# Patient Record
Sex: FEMALE | Race: ASIAN | NOT HISPANIC OR LATINO | URBAN - METROPOLITAN AREA
[De-identification: names, ages, dates, MRNs, and addresses within clinical notes are randomized per-mention and may not be internally consistent; named-entity substitution may affect disease eponyms.]

---

## 2017-07-20 ENCOUNTER — COMMUNICATION - HEALTHEAST (OUTPATIENT)
Dept: FAMILY MEDICINE | Facility: CLINIC | Age: 42
End: 2017-07-20

## 2017-07-24 ENCOUNTER — RECORDS - HEALTHEAST (OUTPATIENT)
Dept: ADMINISTRATIVE | Facility: OTHER | Age: 42
End: 2017-07-24

## 2017-07-24 ENCOUNTER — PRENATAL OFFICE VISIT - HEALTHEAST (OUTPATIENT)
Dept: FAMILY MEDICINE | Facility: CLINIC | Age: 42
End: 2017-07-24

## 2017-07-24 DIAGNOSIS — Z34.90 NORMAL PREGNANCY: ICD-10-CM

## 2017-07-24 DIAGNOSIS — Z32.01 POSITIVE PREGNANCY TEST: ICD-10-CM

## 2017-07-24 DIAGNOSIS — N92.6 ABNORMAL MENSES: ICD-10-CM

## 2017-07-24 DIAGNOSIS — O09.529 ADVANCED MATERNAL AGE IN MULTIGRAVIDA: ICD-10-CM

## 2017-07-24 DIAGNOSIS — B19.10 HEPATITIS B: ICD-10-CM

## 2017-07-24 LAB — HIV 1+2 AB+HIV1 P24 AG SERPL QL IA: NEGATIVE

## 2017-07-24 ASSESSMENT — MIFFLIN-ST. JEOR: SCORE: 1230.91

## 2017-07-25 ENCOUNTER — AMBULATORY - HEALTHEAST (OUTPATIENT)
Dept: FAMILY MEDICINE | Facility: CLINIC | Age: 42
End: 2017-07-25

## 2017-07-25 LAB
HEPATITIS B SURFACE ANTIBODY LHE- HISTORICAL: NEGATIVE
SYPHILIS RPR SCREEN - HISTORICAL: NORMAL

## 2017-07-26 ENCOUNTER — COMMUNICATION - HEALTHEAST (OUTPATIENT)
Dept: FAMILY MEDICINE | Facility: CLINIC | Age: 42
End: 2017-07-26

## 2017-07-26 LAB
HBV CORE AB SERPL QL IA: POSITIVE
HBV SURFACE AG SERPL QL IA: ABNORMAL

## 2017-07-28 ENCOUNTER — HOSPITAL ENCOUNTER (OUTPATIENT)
Dept: ULTRASOUND IMAGING | Facility: HOSPITAL | Age: 42
Discharge: HOME | End: 2017-07-28
Attending: PHYSICIAN ASSISTANT

## 2017-07-28 DIAGNOSIS — Z34.90 NORMAL PREGNANCY: ICD-10-CM

## 2017-07-28 DIAGNOSIS — Z32.01 POSITIVE PREGNANCY TEST: ICD-10-CM

## 2017-07-31 ENCOUNTER — COMMUNICATION - HEALTHEAST (OUTPATIENT)
Dept: FAMILY MEDICINE | Facility: CLINIC | Age: 42
End: 2017-07-31

## 2017-09-05 ENCOUNTER — PRENATAL OFFICE VISIT - HEALTHEAST (OUTPATIENT)
Dept: FAMILY MEDICINE | Facility: CLINIC | Age: 42
End: 2017-09-05

## 2017-09-05 DIAGNOSIS — O09.529 ADVANCED MATERNAL AGE IN MULTIGRAVIDA: ICD-10-CM

## 2017-09-05 DIAGNOSIS — B19.10 HEPATITIS B: ICD-10-CM

## 2017-09-05 DIAGNOSIS — Z34.91 ENCOUNTER FOR SUPERVISION OF NORMAL PREGNANCY IN FIRST TRIMESTER: ICD-10-CM

## 2017-09-07 ENCOUNTER — COMMUNICATION - HEALTHEAST (OUTPATIENT)
Dept: FAMILY MEDICINE | Facility: CLINIC | Age: 42
End: 2017-09-07

## 2017-09-08 LAB — HBV DNA DETECT/QUANT, S: 3870 IU/ML

## 2017-09-22 ENCOUNTER — COMMUNICATION - HEALTHEAST (OUTPATIENT)
Dept: FAMILY MEDICINE | Facility: CLINIC | Age: 42
End: 2017-09-22

## 2017-10-03 ENCOUNTER — PRENATAL OFFICE VISIT - HEALTHEAST (OUTPATIENT)
Dept: FAMILY MEDICINE | Facility: CLINIC | Age: 42
End: 2017-10-03

## 2017-10-03 DIAGNOSIS — Z34.92 SUPERVISION OF NORMAL PREGNANCY IN SECOND TRIMESTER: ICD-10-CM

## 2017-10-03 DIAGNOSIS — B19.10 HEPATITIS B: ICD-10-CM

## 2017-10-03 DIAGNOSIS — O09.522 ELDERLY MULTIGRAVIDA IN SECOND TRIMESTER: ICD-10-CM

## 2017-10-04 LAB — HEPATITIS BE AG, S - HISTORICAL: NEGATIVE

## 2017-10-19 ENCOUNTER — HOSPITAL ENCOUNTER (OUTPATIENT)
Dept: ULTRASOUND IMAGING | Facility: HOSPITAL | Age: 42
Discharge: HOME | End: 2017-10-19

## 2017-10-19 DIAGNOSIS — Z34.92 SUPERVISION OF NORMAL PREGNANCY IN SECOND TRIMESTER: ICD-10-CM

## 2017-11-01 ENCOUNTER — PRENATAL OFFICE VISIT - HEALTHEAST (OUTPATIENT)
Dept: FAMILY MEDICINE | Facility: CLINIC | Age: 42
End: 2017-11-01

## 2017-11-01 DIAGNOSIS — Z00.00 HEALTH CARE MAINTENANCE: ICD-10-CM

## 2017-11-01 DIAGNOSIS — Z34.92 NORMAL PREGNANCY IN SECOND TRIMESTER: ICD-10-CM

## 2017-12-01 ENCOUNTER — PRENATAL OFFICE VISIT - HEALTHEAST (OUTPATIENT)
Dept: FAMILY MEDICINE | Facility: CLINIC | Age: 42
End: 2017-12-01

## 2017-12-01 DIAGNOSIS — B19.10 HEPATITIS B: ICD-10-CM

## 2017-12-01 DIAGNOSIS — Z34.92 NORMAL PREGNANCY IN SECOND TRIMESTER: ICD-10-CM

## 2017-12-01 DIAGNOSIS — H11.002 PTERYGIUM OF LEFT EYE: ICD-10-CM

## 2017-12-01 DIAGNOSIS — Z34.92 SECOND TRIMESTER PREGNANCY: ICD-10-CM

## 2017-12-01 DIAGNOSIS — O28.3 ABNORMAL ULTRASONIC FINDING ON ANTENATAL SCREENING OF MOTHER: ICD-10-CM

## 2017-12-02 LAB — SYPHILIS RPR SCREEN - HISTORICAL: NORMAL

## 2017-12-05 LAB
HBV DNA SERPL NAA+PROBE-ACNC: 1160 [IU]/ML
HBV DNA SERPL NAA+PROBE-LOG IU: 3.1 {LOG_IU}/ML

## 2017-12-07 ENCOUNTER — AMBULATORY - HEALTHEAST (OUTPATIENT)
Dept: FAMILY MEDICINE | Facility: CLINIC | Age: 42
End: 2017-12-07

## 2017-12-07 DIAGNOSIS — O99.019 ANEMIA IN PREGNANCY: ICD-10-CM

## 2017-12-08 ENCOUNTER — COMMUNICATION - HEALTHEAST (OUTPATIENT)
Dept: FAMILY MEDICINE | Facility: CLINIC | Age: 42
End: 2017-12-08

## 2017-12-08 ENCOUNTER — HOSPITAL ENCOUNTER (OUTPATIENT)
Dept: ULTRASOUND IMAGING | Facility: HOSPITAL | Age: 42
Discharge: HOME | End: 2017-12-08
Attending: FAMILY MEDICINE

## 2017-12-20 ENCOUNTER — COMMUNICATION - HEALTHEAST (OUTPATIENT)
Dept: FAMILY MEDICINE | Facility: CLINIC | Age: 42
End: 2017-12-20

## 2017-12-29 ENCOUNTER — PRENATAL OFFICE VISIT - HEALTHEAST (OUTPATIENT)
Dept: FAMILY MEDICINE | Facility: CLINIC | Age: 42
End: 2017-12-29

## 2017-12-29 DIAGNOSIS — Z34.93 NORMAL PREGNANCY IN THIRD TRIMESTER: ICD-10-CM

## 2018-01-26 ENCOUNTER — PRENATAL OFFICE VISIT - HEALTHEAST (OUTPATIENT)
Dept: FAMILY MEDICINE | Facility: CLINIC | Age: 43
End: 2018-01-26

## 2018-01-26 DIAGNOSIS — Z34.93 SUPERVISION OF NORMAL PREGNANCY IN THIRD TRIMESTER: ICD-10-CM

## 2018-01-26 DIAGNOSIS — O99.019 ANEMIA IN PREGNANCY: ICD-10-CM

## 2018-01-26 DIAGNOSIS — K59.03 DRUG-INDUCED CONSTIPATION: ICD-10-CM

## 2018-01-26 LAB
ALBUMIN UR-MCNC: NEGATIVE MG/DL
ERYTHROCYTE [DISTWIDTH] IN BLOOD BY AUTOMATED COUNT: 11.4 % (ref 11–14.5)
GLUCOSE UR STRIP-MCNC: NEGATIVE MG/DL
HCT VFR BLD AUTO: 31.2 % (ref 35–47)
HGB BLD-MCNC: 10.2 G/DL (ref 12–16)
KETONES UR STRIP-MCNC: NEGATIVE MG/DL
MCH RBC QN AUTO: 28.9 PG (ref 27–34)
MCHC RBC AUTO-ENTMCNC: 32.7 G/DL (ref 32–36)
MCV RBC AUTO: 88 FL (ref 80–100)
PLATELET # BLD AUTO: 280 THOU/UL (ref 140–440)
PMV BLD AUTO: 6.3 FL (ref 7–10)
RBC # BLD AUTO: 3.54 MILL/UL (ref 3.8–5.4)
WBC: 9.4 THOU/UL (ref 4–11)

## 2018-01-27 LAB — BACTERIA SPEC CULT: NO GROWTH

## 2018-02-09 ENCOUNTER — PRENATAL OFFICE VISIT - HEALTHEAST (OUTPATIENT)
Dept: FAMILY MEDICINE | Facility: CLINIC | Age: 43
End: 2018-02-09

## 2018-02-09 DIAGNOSIS — O99.013 ANEMIA DURING PREGNANCY IN THIRD TRIMESTER: ICD-10-CM

## 2018-02-09 DIAGNOSIS — Z34.93 NORMAL PREGNANCY IN THIRD TRIMESTER: ICD-10-CM

## 2018-02-09 LAB
ALBUMIN UR-MCNC: NEGATIVE MG/DL
GLUCOSE UR STRIP-MCNC: NEGATIVE MG/DL
KETONES UR STRIP-MCNC: NEGATIVE MG/DL

## 2018-02-09 RX ORDER — FERROUS SULFATE 325(65) MG
1 TABLET ORAL 2 TIMES DAILY
Qty: 90 TABLET | Refills: 1 | Status: SHIPPED | OUTPATIENT
Start: 2018-02-09

## 2018-02-10 LAB
ALLERGIC TO PENICILLIN: NO
GP B STREP DNA SPEC QL NAA+PROBE: NEGATIVE

## 2018-02-21 ENCOUNTER — PRENATAL OFFICE VISIT - HEALTHEAST (OUTPATIENT)
Dept: FAMILY MEDICINE | Facility: CLINIC | Age: 43
End: 2018-02-21

## 2018-02-21 DIAGNOSIS — Z34.93 NORMAL PREGNANCY IN THIRD TRIMESTER: ICD-10-CM

## 2018-03-02 ENCOUNTER — COMMUNICATION - HEALTHEAST (OUTPATIENT)
Dept: FAMILY MEDICINE | Facility: CLINIC | Age: 43
End: 2018-03-02

## 2018-03-02 ENCOUNTER — PRENATAL OFFICE VISIT - HEALTHEAST (OUTPATIENT)
Dept: FAMILY MEDICINE | Facility: CLINIC | Age: 43
End: 2018-03-02

## 2018-03-02 DIAGNOSIS — Z34.93 ENCOUNTER FOR SUPERVISION OF NORMAL PREGNANCY IN THIRD TRIMESTER: ICD-10-CM

## 2018-03-09 ENCOUNTER — PRENATAL OFFICE VISIT - HEALTHEAST (OUTPATIENT)
Dept: FAMILY MEDICINE | Facility: CLINIC | Age: 43
End: 2018-03-09

## 2018-03-09 DIAGNOSIS — Z34.93 SUPERVISION OF NORMAL PREGNANCY IN THIRD TRIMESTER: ICD-10-CM

## 2018-03-13 ENCOUNTER — HOME CARE/HOSPICE - HEALTHEAST (OUTPATIENT)
Dept: HOME HEALTH SERVICES | Facility: HOME HEALTH | Age: 43
End: 2018-03-13

## 2018-03-15 ENCOUNTER — COMMUNICATION - HEALTHEAST (OUTPATIENT)
Dept: OBGYN | Facility: HOSPITAL | Age: 43
End: 2018-03-15

## 2018-04-18 ENCOUNTER — OFFICE VISIT - HEALTHEAST (OUTPATIENT)
Dept: FAMILY MEDICINE | Facility: CLINIC | Age: 43
End: 2018-04-18

## 2018-04-18 DIAGNOSIS — K59.03 DRUG-INDUCED CONSTIPATION: ICD-10-CM

## 2018-04-18 RX ORDER — DOCUSATE SODIUM 100 MG/1
100 CAPSULE, LIQUID FILLED ORAL DAILY PRN
Qty: 30 CAPSULE | Refills: 2 | Status: SHIPPED | OUTPATIENT
Start: 2018-04-18

## 2018-04-18 ASSESSMENT — MIFFLIN-ST. JEOR: SCORE: 1226.95

## 2021-05-31 VITALS — WEIGHT: 142 LBS | BODY MASS INDEX: 26.61 KG/M2

## 2021-05-31 VITALS — BODY MASS INDEX: 26.62 KG/M2 | HEIGHT: 61 IN | WEIGHT: 141 LBS

## 2021-05-31 VITALS — WEIGHT: 149.38 LBS | BODY MASS INDEX: 27.99 KG/M2

## 2021-05-31 VITALS — BODY MASS INDEX: 25.68 KG/M2 | WEIGHT: 137 LBS

## 2021-05-31 VITALS — WEIGHT: 159 LBS | BODY MASS INDEX: 29.8 KG/M2

## 2021-05-31 VITALS — WEIGHT: 153.31 LBS | BODY MASS INDEX: 28.73 KG/M2

## 2021-05-31 VITALS — WEIGHT: 151.4 LBS | BODY MASS INDEX: 28.37 KG/M2

## 2021-05-31 VITALS — BODY MASS INDEX: 24.76 KG/M2 | WEIGHT: 132.12 LBS

## 2021-05-31 VITALS — BODY MASS INDEX: 29.24 KG/M2 | WEIGHT: 156 LBS

## 2021-05-31 VITALS — WEIGHT: 148 LBS | BODY MASS INDEX: 27.74 KG/M2

## 2021-05-31 VITALS — BODY MASS INDEX: 29.61 KG/M2 | WEIGHT: 158 LBS

## 2021-06-01 VITALS — BODY MASS INDEX: 26.62 KG/M2 | HEIGHT: 61 IN | WEIGHT: 141 LBS

## 2021-06-12 NOTE — PROGRESS NOTES
First OB.   Feeling well. No pelvic pain, bleeding.  She notes last delivery in [place].   She declines pelvic exam today, she is not sure about last pap smear, agrees to collect this post partum.   No concerns.   Encouraged good nutrition, well balanced diet, regular activity.   We discussed usual care with hepatitis B, needs hep b dna, this was collected today.   She is aware next visit and remaining prenatal care with Dr. Terrazas.

## 2021-06-12 NOTE — PROGRESS NOTES
Chief Complaint:  Chief Complaint   Patient presents with     Pregnancy Confirmation     pregnancy #: 5, LMP: 10/2016     Fatigue     and unable to eat normally.     HPI:   Jez Her is a 42 y.o. female is here for pregnancy intake.  She is .  Patient's last menstrual period was 10/31/2016.  She is very surprised about this pregnancy.  Her last period was in mid-2016.  No bleeding at all since then.  She thought this meant she was going through menopause.  She has been having breast tenderness, increased fatigue, and decreased appetite for 2-3 weeks.  She had a positive home pregnancy test about 1 week ago.  She also had a positive pregnancy test with her regular doctor, Dr. Davey.  All of her children were born at home in [facility].  One child  at about 1.5 years old after an illness (fever, rash, diarrhea).    Past medical history: reviewed and updated.  No past medical history on file.  No past surgical history on file.    Current Outpatient Prescriptions:      amoxicillin (AMOXIL) 500 MG tablet, Take 1 tablet (500 mg total) by mouth 2 (two) times a day for 7 days., Disp: 14 tablet, Rfl: 0     prenatal vit-iron fum-folic ac (PRENATAL VITAMIN) 27 mg iron- 0.8 mg Tab, Take 1 tablet by mouth once daily., Disp: 100 tablet, Rfl: 3     pyridoxine, vitamin B6, (VITAMIN B-6) 25 MG tablet, Take 1 tablet by mouth 3 times a day, as needed for nausea., Disp: 60 tablet, Rfl: 0    Social:  Social History   Substance Use Topics     Smoking status: Never Smoker     Smokeless tobacco: None     Alcohol use No       OBJECTIVE:  No Known Allergies  Vitals:    17 0950   BP: 110/78   Pulse: 60   Resp: 20   Temp: 98.6  F (37  C)     Body mass index is 26.42 kg/(m^2).    Vital signs stable as recorded above  General: Patient is alert and oriented x 3, in no apparent distress  Fetal Exam: Fundal height was not palpable, fetal heart tones are not heard.    Results:  Results for orders placed or performed in visit on  07/24/17   Culture, Urine   Result Value Ref Range    Culture 50,000-100,000 col/ml Escherichia coli (!)        Susceptibility    Escherichia coli - VICENTE     Amoxicillin + Clavulanate <=4/2 Sensitive      Ampicillin <=4 Sensitive      Cefazolin 2 Sensitive      Cefepime <=1 Sensitive      Ceftriaxone <=1 Sensitive      Ciprofloxacin 1 Sensitive      Gentamicin <=2 Sensitive      Levofloxacin <=1 Sensitive      Meropenem <=0.25 Sensitive      Nitrofurantoin <=16 Sensitive      Tetracycline <=2 Sensitive      Tobramycin <=2 Sensitive      Trimethoprim + Sulfamethoxazole <=0.5 Sensitive    Pregnancy (Beta-hCG, Qual), Urine   Result Value Ref Range    Pregnancy Test, Urine Positive (!) Negative   Urinalysis, OB Screen   Result Value Ref Range    Glucose, UA Negative Negative    Ketones, UA Negative Negative    Protein, UA Trace (!) Negative mg/dL   ABO/RH Typing (OP order)   Result Value Ref Range    HML ABO/Rh Typing B POS     HML ABO/Rh Repeat Typing B POS    Hepatitis B Surface antigen (HBsAG)   Result Value Ref Range    Hepatitis B Surface Ag Positive, Confirm (!) Negative   HIV Antigen/Antibody Screening Cascade   Result Value Ref Range    HIV Antigen / Antibody Negative Negative   HML Antibody Screen   Result Value Ref Range    HML Antibody Screen Negative Negative   RPR   Result Value Ref Range    Syphilis Screen Cascade Non-Reactive Non-Reactive   Rubella Immune Status (IgG)   Result Value Ref Range    Rubella IgG Immune Immune   Lead, Blood   Result Value Ref Range    Lead  <5.0 ug/dL    Collection Method Venous    Drugs of Abuse 1,Urine   Result Value Ref Range    Amphetamines Screen Negative Screen Negative    Benzodiazepines Screen Negative Screen Negative    Opiates Screen Negative Screen Negative    Phencyclidine Screen Negative Screen Negative    THC Screen Negative Screen Negative    Barbiturates Screen Negative Screen Negative    Cocaine Metabolite Screen Negative Screen Negative    Oxycodone Screen  Negative Screen Negative    Creatinine, Urine 289.1 mg/dL   HM1 (CBC with Diff)   Result Value Ref Range    WBC 9.0 4.0 - 11.0 thou/uL    RBC 4.34 3.80 - 5.40 mill/uL    Hemoglobin 12.9 12.0 - 16.0 g/dL    Hematocrit 38.5 35.0 - 47.0 %    MCV 89 80 - 100 fL    MCH 29.7 27.0 - 34.0 pg    MCHC 33.5 32.0 - 36.0 g/dL    RDW 13.2 11.0 - 14.5 %    Platelets 269 140 - 440 thou/uL    MPV 10.6 8.5 - 12.5 fL    Neutrophils % 70 50 - 70 %    Lymphocytes % 22 20 - 40 %    Monocytes % 6 2 - 10 %    Eosinophils % 1 0 - 6 %    Basophils % 1 0 - 2 %    Neutrophils Absolute 6.3 2.0 - 7.7 thou/uL    Lymphocytes Absolute 2.0 0.8 - 4.4 thou/uL    Monocytes Absolute 0.5 0.0 - 0.9 thou/uL    Eosinophils Absolute 0.1 0.0 - 0.4 thou/uL    Basophils Absolute 0.1 0.0 - 0.2 thou/uL   Hepatitis B Surface Antibody (Anti-HBs)   Result Value Ref Range    Hep B Surface Antibody Negative Negative   Hepatitis B Core Antibody (Anti-HBc)   Result Value Ref Range    Hep B Core Total Ab Positive (!) Negative     Other screening pregnancy lab work is ordered and pending.    Patient scored a 1/30 on [facility]  Depression Screen.    Assessment and Plan:  1. Pregnancy Intake Appointment.  Jez Her is 42 y.o. and .  Patient's last menstrual period was 10/31/2016.  Estimated Date of Delivery: 2/15/18 (approximate)  Screening pregnancy lab work was drawn.  Prenatal vitamins prescribed.  Problem list and current medications reviewed and updated.  Dating ultrasound ordered.  Prenatal info packet given.    2. Advanced Maternal Age.  Patient declines any prenatal genetic testing at this time.    3. Hepatitis B.  Patient did not mention this to me at our visit.  Hep B surface Antigen test came back positive.  We will inform her of results, and then plan to follow-up with her at next OB visit.    Follow up in 3 weeks.  Please see OB Episode for complete details.  Visit was approximately 45 minutes, greater than 50% of time spent in face-to-face  counseling and coordination of care.    This dictation uses voice recognition software, which may contain typographical errors.

## 2021-06-13 NOTE — PROGRESS NOTES
No ctx, lof, bleeding, or discharge.  No HA or vision changes.  Good FM: Yes  Nausea and vomiting resolved.  Patient has been feeling well for the last 4 weeks.    Labs/imaging reviewed: Reviewed ultrasound findings with patient.  Choroid plexus cysts and need for follow-up.  Will order this ultrasound at the next visit in 4 weeks.  Discussed aneuploidy risk.    Plan today:   Flu shot: Counseled patient on recommendation.  She has not had a baby in the [place] and so was not sure about all of the recommendations.  She will do today.  We will plan for 1 hour GTT and 28 week labs at the next visit.  Pap smear following delivery.  Counseled on Tdap after 27 weeks.  Patient wondering about having her mother-in-law in the room who is a medicine woman.  In case something happens with the baby.  He is wondering how many people she can have in the room.  She is would like to.    Delmy Terrazas

## 2021-06-13 NOTE — PROGRESS NOTES
No ctx, lof, bleeding, or discharge.  No HA or vision changes.  She is starting to feel a little bit of fetal movement mostly at night.  Appetite is better. Morning sickness improved. Had hyperemesis gravidarum in previous pregnancies and states that she had small babies because of this.  However had babies at home so does not know how big they were.  She is concerned about hepatitis B and its effects on the baby.  Wondering what else she can do to help with the hepatitis B.  States she was aware of her hepatitis B diagnosis about 5-6 years ago.  Because of this she actually did not want to get pregnant again, and was very surprised that she is pregnant currently.    Labs/imaging reviewed: HPV DNA in the 3,000s.     Plan today:   HBeAg and CMP  20 wk anatomy ultrasound ordered  Discussed hepatitis B diagnosis again.  And expectant management  Follow-up in 4 weeks    Delmy Terrazas

## 2021-06-14 NOTE — PROGRESS NOTES
No ctx, lof, bleeding, or discharge.  No HA or vision changes.  Nausea and vomiting has resolved.  She is starting to gain weight she is eating small meals throughout the day.  Trying to pick healthy snacks. She is being seen at [facility] and they are very excited about her weight gain.  Good FM: Yes, lots of it.  More than previous pregnancies.    Labs/imaging reviewed: Choroid plexus cyst noted on previous ultrasound needs follow-up.  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  She has seen an eye doctor for in the past but did not want to do surgery.    Plan today:  1 hour GTT and 28 week labs  Hep B DNA  Continue at [facility].  The weight gain is steadily improving since a 13 week.  Follow-up anatomy ultrasound to assess choroid plexus cyst.  Follow-up in 4 weeks.    Delmy Terrazas

## 2021-06-14 NOTE — PROGRESS NOTES
Please call patient and inform:  Good news. The prior chroid plexus cysts are gone. No further f/u needed.

## 2021-06-14 NOTE — PROGRESS NOTES
Please call patient and inform:  Good news, your hepatitis B level is lower than the previous.  You do not need any treatment or referral at this time.  Other labs were normal no signs of gestational diabetes.  You do have some mild anemia and I would like you to start on iron tablets.  Will send prescription to the pharmacy.

## 2021-06-15 NOTE — PROGRESS NOTES
No ctx, lof, bleeding, or discharge.  No HA or vision changes.  Good FM : yes  Nausea is gone, appetite is back.  No longer taking vitamin B6    Labs/imaging reviewed: Follow-up ultrasound showed choroid plexus cysts were gone.  Follow-up hep B DNA low at 1160.    Plan today:   Tdap  Weight gain about 17 pounds since initial weight loss.  Follow-up in 4 weeks.      Delmy Terrazas

## 2021-06-15 NOTE — PROGRESS NOTES
No ctx, lof, bleeding, or discharge.  No HA or vision changes.  Endorses some leg cramping especially at night and some varicose veins.  She states this is overall improved at this time.  He has been tolerating the iron daily is almost out of her iron tabs.  Constipation has improved.  This is her first baby in the [place] and  just wants to make sure that baby is healthy.  They are not used to routine prenatal care.  They are wondering what the weight of the baby is.  Good FM : yes    Labs/imaging reviewed: last hgb 10.2    Plan today:   Will increase iron to twice daily at this time if as long as she tolerates.  If constipation returns then go back down to once daily.  SVE 1/30/-2/posterior/medium.  Feels head down per Leopold's.  GBS swab obtained today.  Follow-up in a week.    Delmy Terrazas

## 2021-06-15 NOTE — PROGRESS NOTES
Please call patient and inform:  Hemoglobin still low at 10.2. Please start taking iron pill with the stool softener and let me know how this goes. I don't think we are at a level where you would need IV therapy. Can check in on progress at next appt.

## 2021-06-15 NOTE — PROGRESS NOTES
No ctx, lof, bleeding, or discharge.  No HA or vision changes.  Good FM yes!  Not taking her iron pill. Constipated. Just stopped two weeks ago.   Not feelign lightheaded anymore.     10lb weight gain total but 19lb after initial weight loss.     Plan today:   Colace for constipation with iron pill.   Check CBC  Urine cx for E.coli asymptomatic bacteruria early in pregnancy.   Plans to breastfeed  PPBC: none  F/u in 2 wks- GBS swab next visit discussed      Delmy Terrazas

## 2021-06-16 PROBLEM — Z34.90 PREGNANT: Status: ACTIVE | Noted: 2018-03-12

## 2021-06-16 PROBLEM — B19.10 HEPATITIS B: Status: ACTIVE | Noted: 2017-07-25

## 2021-06-16 PROBLEM — O09.529 ADVANCED MATERNAL AGE IN MULTIGRAVIDA: Status: ACTIVE | Noted: 2017-07-24

## 2021-06-16 PROBLEM — Z34.90 NORMAL PREGNANCY: Status: ACTIVE | Noted: 2017-07-24

## 2021-06-16 PROBLEM — H11.002 PTERYGIUM OF LEFT EYE: Status: ACTIVE | Noted: 2017-12-01

## 2021-06-16 NOTE — PROGRESS NOTES
No ctx, lof, bleeding, or discharge.  No HA or vision changes.  Good FM : yes!  Go questions or concerns today- just ready to have this baby. Did have a little bit of spotting over the last week. Very small amount    Plan today:   SVE with minimal change, a little bloody show  Did not want membranes swept. Would like to wait.   F/u in 1 week. Will discuss more about induction at next weeks appt if still pregnant    Delmy Terrazas

## 2021-06-16 NOTE — PROGRESS NOTES
No ctx, lof, bleeding, or discharge.  No HA or vision changes.  Having more cramping/pressure pain.  Feels like baby is dropping into the pelvis.  Patient was worried because baby was moving constantly for 20 minutes yesterday.  And then again at 2 AM the same thing.  I did get a little confused and I do not know if it was lost through interpretation- at one point I thought she was saying she is concerned about some decreased fetal movement.  But I confirmed multiple times that the fetal movement has not changed or still having good fetal movement she was just concerned about the baby moving too much for that 20 minutes and was wondering if there is something wrong with that.  Having some clear discharge looks like mucus.      Plan today:   Discussed and reviewed appropriate fetal movement. Provided reassurance regarding the 20 minutes of movement.   Sound like baby is coming down in the pelvis and like she lost her mucous plug. SVE is increased to 2cm but baby is ballotable.   - F/u in 1 week. Patient did not want her membranes swept today. Started to discuss induction and f/u in 1 week.     Delmy Terrazas

## 2021-06-16 NOTE — PROGRESS NOTES
No ctx, lof, bleeding, or discharge.  No HA or vision changes. Feels tired and uncomfortable  Good FM : lots especially at night 40 minutes at a time keeping her up  Patient very concerned, wanted to make sure we wouldn't do a circumcision without their permission. They do not want a circ.    Plan today:   reviewed s/s of labor.   Provided reassurance that circumcision is elective not routine procedure. And its their decision.   Reviewed monitoring for fetal movement  F/u in 1 week    Delmy Terrazas

## 2021-06-17 NOTE — PROGRESS NOTES
"Post Partum Check:    Delivery at 40w4d now .  Date of delivery: 3/12/18    Patient concerns: none    Bleeding: no concerns. Lasted 5 days.     Pain: no concerns    LMP:Patient's last menstrual period was 10/31/2016.     Depression: no concerns  EPDS Score: 0    Contraception Plan:  None, declines    Immunizations needed:  none    Pap smear:  She is not sure if she has ever had a Pap smear before.  We reviewed what a Pap smear is, the recommendations for screening and described the procedure.  She states she does not want to do this today because she is still \"cleansing herself with Hmong herbs \".    Assessment:  Well post partum check up.    Plan:  Will come back in 2-3 months to have Pap smear and pelvic exam.  Refill Colace for constipation       "

## 2022-10-04 PROBLEM — O35.00X0 MATERNAL CARE FOR SUSPECTED CENTRAL NERVOUS SYSTEM MALFORMATION IN FETUS: Status: ACTIVE | Noted: 2017-12-01
